# Patient Record
Sex: FEMALE | Race: BLACK OR AFRICAN AMERICAN | Employment: FULL TIME | URBAN - METROPOLITAN AREA
[De-identification: names, ages, dates, MRNs, and addresses within clinical notes are randomized per-mention and may not be internally consistent; named-entity substitution may affect disease eponyms.]

---

## 2022-03-19 PROBLEM — E88.81 METABOLIC SYNDROME X: Status: ACTIVE | Noted: 2018-03-27

## 2022-03-19 PROBLEM — E88.810 METABOLIC SYNDROME X: Status: ACTIVE | Noted: 2018-03-27

## 2022-03-19 PROBLEM — E28.2 PCOS (POLYCYSTIC OVARIAN SYNDROME): Status: ACTIVE | Noted: 2018-03-27

## 2022-11-30 ENCOUNTER — HOSPITAL ENCOUNTER (EMERGENCY)
Age: 27
Discharge: HOME | End: 2022-11-30
Attending: EMERGENCY MEDICINE

## 2022-11-30 VITALS
SYSTOLIC BLOOD PRESSURE: 152 MMHG | DIASTOLIC BLOOD PRESSURE: 105 MMHG | HEART RATE: 73 BPM | RESPIRATION RATE: 20 BRPM | OXYGEN SATURATION: 100 % | TEMPERATURE: 98.6 F

## 2022-11-30 DIAGNOSIS — R31.9 HEMATURIA, UNSPECIFIED TYPE: Primary | ICD-10-CM

## 2022-11-30 LAB
ALBUMIN SERPL-MCNC: 4.2 G/DL (ref 3.5–5)
ALBUMIN/GLOB SERPL: 1.3 {RATIO} (ref 0.4–1.6)
ALP SERPL-CCNC: 70 U/L (ref 45–117)
ALT SERPL-CCNC: 13 U/L (ref 13–61)
ANION GAP SERPL CALC-SCNC: 12 MMOL/L (ref 2–11)
APPEARANCE UR: ABNORMAL
AST SERPL-CCNC: 20 U/L (ref 15–37)
BACTERIA URNS QL MICRO: 0 /HPF
BASOPHILS # BLD: 0 K/UL (ref 0–0.2)
BASOPHILS NFR BLD: 0 % (ref 0–2)
BILIRUB SERPL-MCNC: 0.6 MG/DL (ref 0.2–1.1)
BILIRUB UR QL: NEGATIVE
BUN SERPL-MCNC: 9 MG/DL (ref 7–18)
CALCIUM SERPL-MCNC: 9.3 MG/DL (ref 8.3–10.4)
CASTS URNS QL MICRO: 0 /LPF
CHLORIDE SERPL-SCNC: 104 MMOL/L (ref 98–107)
CO2 SERPL-SCNC: 24 MMOL/L (ref 21–32)
COLOR UR: ABNORMAL
CREAT SERPL-MCNC: 0.72 MG/DL (ref 0.6–1)
CRYSTALS URNS QL MICRO: 0 /LPF
DIFFERENTIAL METHOD BLD: ABNORMAL
EOSINOPHIL # BLD: 0.2 K/UL (ref 0–0.8)
EOSINOPHIL NFR BLD: 2 % (ref 0.5–7.8)
EPI CELLS #/AREA URNS HPF: NORMAL /HPF
ERYTHROCYTE [DISTWIDTH] IN BLOOD BY AUTOMATED COUNT: 13.3 % (ref 11.9–14.6)
GLOBULIN SER CALC-MCNC: 3.3 G/DL (ref 2.8–4.5)
GLUCOSE SERPL-MCNC: 91 MG/DL (ref 65–100)
GLUCOSE UR STRIP.AUTO-MCNC: NEGATIVE MG/DL
HCG UR QL: NEGATIVE
HCT VFR BLD AUTO: 39.4 % (ref 35.8–46.3)
HGB BLD-MCNC: 13.1 G/DL (ref 11.7–15.4)
HGB UR QL STRIP: ABNORMAL
IMM GRANULOCYTES # BLD AUTO: 0 K/UL (ref 0–0.5)
IMM GRANULOCYTES NFR BLD AUTO: 0 % (ref 0–5)
KETONES UR QL STRIP.AUTO: NEGATIVE MG/DL
LEUKOCYTE ESTERASE UR QL STRIP.AUTO: ABNORMAL
LYMPHOCYTES # BLD: 3.1 K/UL (ref 0.5–4.6)
LYMPHOCYTES NFR BLD: 29 % (ref 13–44)
MCH RBC QN AUTO: 27 PG (ref 26.1–32.9)
MCHC RBC AUTO-ENTMCNC: 33.2 G/DL (ref 31.4–35)
MCV RBC AUTO: 81.2 FL (ref 82–102)
MONOCYTES # BLD: 0.8 K/UL (ref 0.1–1.3)
MONOCYTES NFR BLD: 7 % (ref 4–12)
MUCOUS THREADS URNS QL MICRO: 0 /LPF
NEUTS SEG # BLD: 6.5 K/UL (ref 1.7–8.2)
NEUTS SEG NFR BLD: 61 % (ref 43–78)
NITRITE UR QL STRIP.AUTO: NEGATIVE
NRBC # BLD: 0 K/UL (ref 0–0.2)
OTHER OBSERVATIONS: NORMAL
PH UR STRIP: 6 [PH] (ref 5–9)
PLATELET # BLD AUTO: 351 K/UL (ref 150–450)
PMV BLD AUTO: 9.6 FL (ref 9.4–12.3)
POTASSIUM SERPL-SCNC: 3.7 MMOL/L (ref 3.5–5.1)
PROT SERPL-MCNC: 7.5 G/DL (ref 6.4–8.2)
PROT UR STRIP-MCNC: NEGATIVE MG/DL
RBC # BLD AUTO: 4.85 M/UL (ref 4.05–5.2)
RBC #/AREA URNS HPF: >100 /HPF
SODIUM SERPL-SCNC: 140 MMOL/L (ref 133–143)
SP GR UR REFRACTOMETRY: 1.02 (ref 1–1.02)
UROBILINOGEN UR QL STRIP.AUTO: 0.2 EU/DL (ref 0.2–1)
WBC # BLD AUTO: 10.6 K/UL (ref 4.3–11.1)
WBC URNS QL MICRO: NORMAL /HPF

## 2022-11-30 PROCEDURE — 81001 URINALYSIS AUTO W/SCOPE: CPT

## 2022-11-30 PROCEDURE — 87491 CHLMYD TRACH DNA AMP PROBE: CPT

## 2022-11-30 PROCEDURE — 80053 COMPREHEN METABOLIC PANEL: CPT

## 2022-11-30 PROCEDURE — 85025 COMPLETE CBC W/AUTO DIFF WBC: CPT

## 2022-11-30 PROCEDURE — 81025 URINE PREGNANCY TEST: CPT

## 2022-11-30 PROCEDURE — 99283 EMERGENCY DEPT VISIT LOW MDM: CPT

## 2022-11-30 PROCEDURE — 87210 SMEAR WET MOUNT SALINE/INK: CPT

## 2022-11-30 RX ORDER — CEPHALEXIN 500 MG/1
500 CAPSULE ORAL 3 TIMES DAILY
Qty: 30 CAPSULE | Refills: 0 | Status: SHIPPED | OUTPATIENT
Start: 2022-11-30 | End: 2022-12-10

## 2022-11-30 ASSESSMENT — ENCOUNTER SYMPTOMS
DIARRHEA: 0
RHINORRHEA: 0
CONSTIPATION: 0
SORE THROAT: 0
ABDOMINAL PAIN: 1
COLOR CHANGE: 0
VOMITING: 0
COUGH: 0
BACK PAIN: 1
SHORTNESS OF BREATH: 0
NAUSEA: 1

## 2022-11-30 ASSESSMENT — PAIN - FUNCTIONAL ASSESSMENT: PAIN_FUNCTIONAL_ASSESSMENT: 0-10

## 2022-11-30 ASSESSMENT — PAIN SCALES - GENERAL: PAINLEVEL_OUTOF10: 6

## 2022-11-30 ASSESSMENT — PAIN DESCRIPTION - LOCATION: LOCATION: PELVIS

## 2022-12-01 LAB
SERVICE CMNT-IMP: NORMAL
WET PREP GENITAL: NORMAL
WET PREP GENITAL: NORMAL

## 2022-12-01 NOTE — ED PROVIDER NOTES
hematuria as gross hematuria. The hematuria occurs during the initial portion of her urinary stream. The pain is moderate. Obstructive symptoms do not include an intermittent stream or straining. Associated symptoms include abdominal pain and nausea. Pertinent negatives include no chills, dysuria, fever, flank pain, genital pain, inability to urinate, urinary retention or vomiting. All other systems reviewed and are negative unless otherwise stated in the history of present illness section. Review of Systems   Constitutional:  Negative for chills and fever. HENT:  Negative for rhinorrhea and sore throat. Respiratory:  Negative for cough and shortness of breath. Cardiovascular:  Negative for chest pain and palpitations. Gastrointestinal:  Positive for abdominal pain and nausea. Negative for constipation, diarrhea and vomiting. Genitourinary:  Positive for hematuria. Negative for dysuria, flank pain, vaginal bleeding and vaginal discharge. Musculoskeletal:  Positive for back pain. Negative for neck pain. Skin:  Negative for color change and rash. Neurological:  Negative for numbness and headaches. All other systems reviewed and are negative. No past medical history on file. No past surgical history on file. No family history on file. Social History     Socioeconomic History    Marital status: Single        Allergies: Patient has no known allergies. Previous Medications    No medications on file        Vitals signs and nursing note reviewed. No data found. Physical Exam  Vitals and nursing note reviewed. Constitutional:       Appearance: Normal appearance. HENT:      Head: Normocephalic and atraumatic. Cardiovascular:      Rate and Rhythm: Normal rate and regular rhythm. Pulmonary:      Effort: Pulmonary effort is normal.      Breath sounds: Normal breath sounds. No wheezing.    Abdominal:      General: Bowel sounds are normal.      Palpations: Abdomen is soft.      Tenderness: There is abdominal tenderness (mild suprapubic). Musculoskeletal:         General: No swelling. Normal range of motion. Cervical back: Normal range of motion. No tenderness. Skin:     General: Skin is warm and dry. Neurological:      Mental Status: She is alert.         Procedures      Results for orders placed or performed during the hospital encounter of 11/30/22   Wet prep, genital    Specimen: Vaginal   Result Value Ref Range    Special Requests NO SPECIAL REQUESTS      Wet Prep NO YEAST,TRICHOMONAS OR CLUE CELLS NOTED     Pregnancy, Urine   Result Value Ref Range    HCG(Urine) Pregnancy Test Negative     Urinalysis w rflx microscopic   Result Value Ref Range    Color, UA KYM      Appearance SLIGHTLY CLOUDY      Specific Gravity, UA 1.020 1.001 - 1.023      pH, Urine 6.0 5.0 - 9.0      Protein, UA Negative NEG mg/dL    Glucose, UA Negative mg/dL    Ketones, Urine Negative NEG mg/dL    Bilirubin Urine Negative NEG      Blood, Urine LARGE (A) NEG      Urobilinogen, Urine 0.2 0.2 - 1.0 EU/dL    Nitrite, Urine Negative NEG      Leukocyte Esterase, Urine TRACE (A) NEG     Urinalysis, Micro   Result Value Ref Range    WBC, UA 0-3 0 /hpf    RBC, UA >100 0 /hpf    Epithelial Cells UA 0-3 0 /hpf    BACTERIA, URINE 0 0 /hpf    Casts 0 0 /lpf    Crystals 0 0 /LPF    Mucus, UA 0 0 /lpf    Other observations RESULTS VERIFIED MANUALLY     CBC with Auto Differential   Result Value Ref Range    WBC 10.6 4.3 - 11.1 K/uL    RBC 4.85 4.05 - 5.20 M/uL    Hemoglobin 13.1 11.7 - 15.4 g/dL    Hematocrit 39.4 35.8 - 46.3 %    MCV 81.2 (L) 82.0 - 102.0 FL    MCH 27.0 26.1 - 32.9 PG    MCHC 33.2 31.4 - 35.0 g/dL    RDW 13.3 11.9 - 14.6 %    Platelets 752 929 - 990 K/uL    MPV 9.6 9.4 - 12.3 FL    nRBC 0.00 0.0 - 0.2 K/uL    Differential Type AUTOMATED      Seg Neutrophils 61 43 - 78 %    Lymphocytes 29 13 - 44 %    Monocytes 7 4.0 - 12.0 %    Eosinophils % 2 0.5 - 7.8 %    Basophils 0 0.0 - 2.0 % Immature Granulocytes 0 0.0 - 5.0 %    Segs Absolute 6.5 1.7 - 8.2 K/UL    Absolute Lymph # 3.1 0.5 - 4.6 K/UL    Absolute Mono # 0.8 0.1 - 1.3 K/UL    Absolute Eos # 0.2 0.0 - 0.8 K/UL    Basophils Absolute 0.0 0.0 - 0.2 K/UL    Absolute Immature Granulocyte 0.0 0.0 - 0.5 K/UL   Comprehensive Metabolic Panel   Result Value Ref Range    Sodium 140 133 - 143 mmol/L    Potassium 3.7 3.5 - 5.1 mmol/L    Chloride 104 98 - 107 mmol/L    CO2 24 21 - 32 mmol/L    Anion Gap 12 (H) 2 - 11 mmol/L    Glucose 91 65 - 100 mg/dL    BUN 9 7.0 - 18.0 MG/DL    Creatinine 0.72 0.6 - 1.0 MG/DL    Est, Glom Filt Rate >60 >60 ml/min/1.73m2    Calcium 9.3 8.3 - 10.4 MG/DL    Total Bilirubin 0.6 0.2 - 1.1 MG/DL    ALT 13 13.0 - 61.0 U/L    AST 20 15 - 37 U/L    Alk Phosphatase 70 45.0 - 117.0 U/L    Total Protein 7.5 6.4 - 8.2 g/dL    Albumin 4.2 3.5 - 5.0 g/dL    Globulin 3.3 2.8 - 4.5 g/dL    Albumin/Globulin Ratio 1.3 0.4 - 1.6          No orders to display                         Voice dictation software was used during the making of this note. This software is not perfect and grammatical and other typographical errors may be present. This note has not been completely proofread for errors.      Alicia Quevedo III, MD  11/30/22 9310

## 2022-12-01 NOTE — ED TRIAGE NOTES
Arrives with face mask in place. Ambulatory to room 2, accompanied by visitor. Reports hx PCOS, has taken meds in past for menstrual cycles however has not had recently. Reports approx 2 weeks ago, began experiencing lower abdominal/pelvic pain of which worsened today. Associated nausea. States began noticing blood to urine today. Denies pain with urination. Denies vomiting,diarrhea, fevers, vaginal discharge. Received transport form from [facility] to [facility]. Transportation arranged with Valeria at [email or web address] for 1630. Job #[id number] Notified ANTONY Montieloma.

## 2022-12-01 NOTE — ED NOTES
I have reviewed discharge instructions with the patient. The patient verbalized understanding. Patient left ED via Discharge Method: ambulatory to Home with FAMILY MEMBER. Opportunity for questions and clarification provided. Patient given 1 scripts. To continue your aftercare when you leave the hospital, you may receive an automated call from our care team to check in on how you are doing. This is a free service and part of our promise to provide the best care and service to meet your aftercare needs.  If you have questions, or wish to unsubscribe from this service please call [phone]. Thank you for Choosing our [facility] Emergency Department.       Abner Bar RN  11/30/22 3599

## 2022-12-04 LAB
C TRACH RRNA SPEC QL NAA+PROBE: NEGATIVE
N GONORRHOEA RRNA SPEC QL NAA+PROBE: NEGATIVE
SPECIMEN SOURCE: NORMAL

## 2023-03-08 ENCOUNTER — HOSPITAL ENCOUNTER (EMERGENCY)
Age: 28
Discharge: HOME | End: 2023-03-08
Attending: EMERGENCY MEDICINE

## 2023-03-08 ENCOUNTER — APPOINTMENT (OUTPATIENT)
Dept: ULTRASOUND IMAGING | Age: 28
End: 2023-03-08

## 2023-03-08 ENCOUNTER — APPOINTMENT (OUTPATIENT)
Dept: GENERAL RADIOLOGY | Age: 28
End: 2023-03-08

## 2023-03-08 VITALS
RESPIRATION RATE: 17 BRPM | WEIGHT: 293 LBS | TEMPERATURE: 98.4 F | OXYGEN SATURATION: 100 % | HEART RATE: 71 BPM | DIASTOLIC BLOOD PRESSURE: 91 MMHG | HEIGHT: 64 IN | BODY MASS INDEX: 50.02 KG/M2 | SYSTOLIC BLOOD PRESSURE: 152 MMHG

## 2023-03-08 DIAGNOSIS — M25.572 ACUTE LEFT ANKLE PAIN: Primary | ICD-10-CM

## 2023-03-08 PROCEDURE — 99284 EMERGENCY DEPT VISIT MOD MDM: CPT

## 2023-03-08 PROCEDURE — 93971 EXTREMITY STUDY: CPT

## 2023-03-08 PROCEDURE — 6360000002 HC RX W HCPCS: Performed by: EMERGENCY MEDICINE

## 2023-03-08 PROCEDURE — 73610 X-RAY EXAM OF ANKLE: CPT

## 2023-03-08 RX ORDER — DEXAMETHASONE SODIUM PHOSPHATE 10 MG/ML
10 INJECTION INTRAMUSCULAR; INTRAVENOUS ONCE
Status: COMPLETED | OUTPATIENT
Start: 2023-03-08 | End: 2023-03-08

## 2023-03-08 RX ADMIN — DEXAMETHASONE SODIUM PHOSPHATE 10 MG: 10 INJECTION INTRAMUSCULAR; INTRAVENOUS at 08:48

## 2023-03-08 ASSESSMENT — PAIN SCALES - GENERAL
PAINLEVEL_OUTOF10: 4
PAINLEVEL_OUTOF10: 6

## 2023-03-08 ASSESSMENT — LIFESTYLE VARIABLES
HOW OFTEN DO YOU HAVE A DRINK CONTAINING ALCOHOL: NEVER
HOW MANY STANDARD DRINKS CONTAINING ALCOHOL DO YOU HAVE ON A TYPICAL DAY: PATIENT DOES NOT DRINK

## 2023-03-08 ASSESSMENT — PAIN - FUNCTIONAL ASSESSMENT
PAIN_FUNCTIONAL_ASSESSMENT: 0-10
PAIN_FUNCTIONAL_ASSESSMENT: 0-10

## 2023-03-08 NOTE — ED PROVIDER NOTES
Emergency Department Provider Note                   PCP:                None None               Age: 29 y.o. Sex: female     Final diagnosis/impression:  1. Acute left ankle pain         Disposition: Discharge    MDM/Clinical Course:  Patient seen by myself at the [place] [address] emergency department. History was collected from the patient. In consideration of patient problem this represents an acute problem with potential consequences to func bodily function tion. Patient had signs symptoms and clinical history most consistent with ankle pain of unclear etiology. Factors complicating medical decision making or management more complex include unclear nature of etiology of patient's symptoms. Work-up considered but not performed includes laboratory work-up as it was not felt to be high yield for patient management. Patient does not have signs of soft tissue inflammation or erythema, skin changes or findings of cellulitis.,  Do not suspect septic arthritis and patient's symptoms/physical exam findings not particularly consistent with gout either. Overall suspect irritation of previous injury or reinjury of the ankle in an unknown fashion which can happen with outpatient awareness still a later time. Radiology shows no evidence of DVT and no evidence of acute bony abnormality including no effusion. While under my care, patient received oral Decadron dose 10 mg one-time. Recommended rest, ice, elevation, recommended over-the-counter Tylenol/ibuprofen use, recommended ankle stabilizer as needed, follow-up with a primary care provider. Patient/family given instructions to return as needed for any questions, concerns or worsening symptoms, particularly those as outlined in the disposition section / discharge section of patient discharge paperwork. Patient/family verbalizes understanding agreement with ED course/plan in shared medical decision making. Questions answered.     Did discuss with patient that medications given during visit / discharge prescriptions would not nessarily be those selected if patient were pregnant and such medications could potentially cause harm to a developing fetus or pregnancy. Patient is declining pregnancy testing during visit which is reasonable and within her choice as patient here in the emergency department. Patient does not feel she could be pregnant. Complexity of Problems Addressed: An acute problem with uncertain diagnosis/prognosis    Data Reviewed and Analyzed:  Category 1:   I reviewed records from an external source: provider visit notes from outside specialist.  1/23/2019 orthopedic office visit    Category 2:       Radiology:  My independent review of patient ankle x-ray shows no joint space abnormality, no dislocation, no fracture    Category 3: Discussion of management or test interpretation. See MDM / clinical course section above for details    Risk of Complications and/or Morbidity of Patient Management:  OTC drug management performed and Considerations: The following items were considered but not ordered: Laboratory testing, attempt to analyze joint fluid    Shared medical decision making performed with patient/family during course of ER visit and a determination of disposition whenever appropriate. Orders Placed This Encounter   Procedures    XR ANKLE LEFT (MIN 3 VIEWS)    Vascular duplex lower extremity venous left      ED Meds Given:  Medications   dexamethasone (DECADRON) Oral 10 mg (10 mg Oral Given 3/8/23 0848)     There are no discharge medications for this patient.        ___________________    HPI: Valery Pritchett is a 29 y.o. female with no pertinent past medical history presenting for evaluation of ankle pain symptoms. Patient notes roughly 2 to 3 years ago she suffered a bad ankle sprain/injury to the left ankle, was diagnosed with a sprain at that time and used a walking boot during process of recovery.   Patient has not had issues with the ankle since that time per her report. Patient notes on Saturday she began having increasing pain and swelling at the ankle, describes pain in the anterior lateral ankle as well as the posterior medial ankle. Patient denies any known fall or injury. Patient denies any fevers or chills, denies any previous history of blood clots, denies smoking history. Patient denies history of IV drug use. No skin changes. Patient grandfather does have a history of gout but patient and patient father do not. Patient is able to mobilize the joint somewhat though it is painful. Patient reports some soft tissue swelling along the area as previously mentioned. Patient/family denies any other evaluation for today's acute complaint. Patient/family denies any other aggravating or alleviating factors. Patient/family denies any other symptoms. ROS:   All review of systems negative except as noted above in the history of the present illness. Past Medical/ Family/ Social History:     Medical history: History reviewed. No pertinent past medical history. Patient denies pertinent medical history  Surgical history: History reviewed. No pertinent surgical history. Family history: History reviewed. No pertinent family history. HTN, CAD, Grandfather gout    Social history:   Social History     Socioeconomic History    Marital status: Single     Spouse name: None    Number of children: None    Years of education: None    Highest education level: None   Tobacco Use    Smoking status: Never    Smokeless tobacco: Never        Medications:   Previous Medications    No medications on file        Allergies: No Known Allergies    Physical Exam   Vitals signs reviewed.    Patient Vitals for the past 4 hrs:   Temp Pulse Resp BP SpO2   03/08/23 0752 -- -- -- (!) 164/96 --   03/08/23 0751 98.4 °F (36.9 °C) 76 18 -- 98 %       General: Alert and oriented ×4, no acute distress   Eyes: Anicteric, conjunctiva pink, PERRLA, EOMI  ENT: No nasal discharge, no gross nasal congestion present  Pulmonary: Clear to auscultation bilaterally with symmetric chest rise, no increased work of breathing, no accessory muscle use  Cardiovascular: Regular rate and rhythm, no rub or gallop appreciated on my exam  GI: Abdomen is soft, nontender, nondistended  Musculoskeletal: There is soft tissue swelling of the ankle particularly anterior lateral and posterior medial areas. No obvious joint deformity or joint effusion. DPA pulses 2+ bilaterally, capillary fill less than 2 seconds bilaterally, sensation and motor function intact, patient is able to flex/extend and mobilize the ankle though it is mildly uncomfortable for the patient. No overlying skin changes. No significant/noted calor of the joint  Neuro: Cranial nerves II through VII grossly intact, strength and sensation is grossly intact in the upper and lower extremities bilaterally  Skin: Skin is warm and dry    Procedures    Results for orders placed or performed during the hospital encounter of 03/08/23   XR ANKLE LEFT (MIN 3 VIEWS)    Narrative    Left ankle    CLINICAL INDICATION: Left ankle pain with soft tissue swelling    FINDINGS: Three views of the left ankle submitted. There is no fracture. There  is a small amount of generalized soft tissue swelling. Ankle mortise is intact. Impression    No acute osseous abnormality or joint derangement of the left ankle. Vascular duplex lower extremity venous left    Narrative    Exam: Left lower extremity venous ultrasound  Indication: LEG SWELLING, PAIN, DVT SUSPECTED  Comparison: None. Grayscale, color Doppler, and spectral Doppler ultrasound of the left lower  extremity was performed. FINDINGS:   There is normal flow in the common femoral, superficial femoral, profunda  femoral, and popliteal veins. Normal flow in the greater saphenous vein. Normal  compression and augmentation is demonstrated. The proximal calf veins are also  patent. Impression    No evidence of deep venous thrombosis in the left lower extremity. XR ANKLE LEFT (MIN 3 VIEWS)   Final Result   No acute osseous abnormality or joint derangement of the left ankle. Vascular duplex lower extremity venous left   Final Result   No evidence of deep venous thrombosis in the left lower extremity. Voice dictation software was used during the making of this note. This software is not perfect and grammatical and other typographical errors may be present. This note has not been completely proofread for errors.      Delon Encarnacion MD  03/08/23 8639

## 2023-03-08 NOTE — ED TRIAGE NOTES
Pt arrives for complaints of left ankle swelling and pain for a few days now. Pt reports she injured this ankle \"a while ago\" and has been having problems since. No new injury. Sensation intact distal to injury/pain. No deformity noted.

## 2023-03-08 NOTE — DISCHARGE INSTRUCTIONS
Recommend elevating, icing your ankle, may also use ankle wrap/ankle support braces as needed. Recommend close monitoring of symptoms, watch out for signs of development of fever, skin changes, increasing/unremitting pain return as needed for any of the symptoms or for any questions or concerns. You may take ibuprofen/Tylenol as needed for pain/inflammation symptoms.

## 2023-03-08 NOTE — Clinical Note
Deny Navas was seen and treated in our emergency department on 3/8/2023. She may return to work on 03/11/2023. Please allow patient to elevate, ice, rest left ankle as needed to help prevent worsening of ankle pain/injuries     If you have any questions or concerns, please don't hesitate to call.       Madison Gilman MD

## 2023-03-08 NOTE — ED NOTES
I have reviewed discharge instructions with the patient. The patient verbalized understanding. Patient left ED via Discharge Method: ambulatory to Home with self    Opportunity for questions and clarification provided. Patient given 0 scripts. To continue your aftercare when you leave the hospital, you may receive an automated call from our care team to check in on how you are doing. This is a free service and part of our promise to provide the best care and service to meet your aftercare needs.  If you have questions, or wish to unsubscribe from this service please call [phone]. Thank you for Choosing our [address] [place] [address] Emergency Department.       Thania Dunn RN  03/08/23 9270

## 2023-07-03 ENCOUNTER — OFFICE VISIT (OUTPATIENT)
Dept: OBGYN CLINIC | Age: 28
End: 2023-07-03

## 2023-07-03 VITALS
HEIGHT: 64 IN | DIASTOLIC BLOOD PRESSURE: 82 MMHG | WEIGHT: 293 LBS | BODY MASS INDEX: 50.02 KG/M2 | SYSTOLIC BLOOD PRESSURE: 124 MMHG

## 2023-07-03 DIAGNOSIS — Z13.89 SCREENING FOR GENITOURINARY CONDITION: ICD-10-CM

## 2023-07-03 DIAGNOSIS — E28.2 PCOS (POLYCYSTIC OVARIAN SYNDROME): Primary | ICD-10-CM

## 2023-07-03 DIAGNOSIS — Z12.4 SCREENING FOR CERVICAL CANCER: ICD-10-CM

## 2023-07-03 DIAGNOSIS — Z01.419 WELL WOMAN EXAM: ICD-10-CM

## 2023-07-03 LAB
BILIRUBIN, URINE, POC: NEGATIVE
BLOOD URINE, POC: NEGATIVE
GLUCOSE URINE, POC: NEGATIVE
KETONES, URINE, POC: NEGATIVE
LEUKOCYTE ESTERASE, URINE, POC: NORMAL
NITRITE, URINE, POC: NEGATIVE
PH, URINE, POC: 7 (ref 4.6–8)
PROTEIN,URINE, POC: NEGATIVE
SPECIFIC GRAVITY, URINE, POC: 1.02 (ref 1–1.03)
URINALYSIS CLARITY, POC: CLEAR
URINALYSIS COLOR, POC: YELLOW
UROBILINOGEN, POC: NORMAL

## 2023-07-03 PROCEDURE — 99385 PREV VISIT NEW AGE 18-39: CPT | Performed by: NURSE PRACTITIONER

## 2023-07-03 PROCEDURE — 81003 URINALYSIS AUTO W/O SCOPE: CPT | Performed by: NURSE PRACTITIONER

## 2023-07-07 LAB
CYTOLOGIST CVX/VAG CYTO: NORMAL
CYTOLOGY CVX/VAG DOC THIN PREP: NORMAL
HPV REFLEX: NORMAL
Lab: NORMAL
PATH REPORT.FINAL DX SPEC: NORMAL
STAT OF ADQ CVX/VAG CYTO-IMP: NORMAL

## 2023-07-10 ENCOUNTER — TELEPHONE (OUTPATIENT)
Dept: OBGYN CLINIC | Age: 28
End: 2023-07-10

## 2023-07-10 DIAGNOSIS — A59.9 TRICHOMONAS INFECTION: Primary | ICD-10-CM

## 2023-07-10 RX ORDER — METRONIDAZOLE 500 MG/1
2000 TABLET ORAL ONCE
Qty: 4 TABLET | Refills: 0 | Status: SHIPPED | OUTPATIENT
Start: 2023-07-10 | End: 2023-07-10

## 2023-07-10 NOTE — TELEPHONE ENCOUNTER
----- Message from SHMUEL Conway - CNP sent at 7/10/2023  7:46 AM EDT -----  Normal pap  Pos for trich  Flagyl 2g PO x1  Partner needs tx  ISAAC 6 weeks

## 2023-07-11 ENCOUNTER — TELEPHONE (OUTPATIENT)
Dept: OBGYN CLINIC | Age: 28
End: 2023-07-11

## 2023-08-29 ENCOUNTER — OFFICE VISIT (OUTPATIENT)
Dept: OBGYN CLINIC | Age: 28
End: 2023-08-29

## 2023-08-29 VITALS
HEIGHT: 64 IN | WEIGHT: 293 LBS | SYSTOLIC BLOOD PRESSURE: 134 MMHG | DIASTOLIC BLOOD PRESSURE: 88 MMHG | BODY MASS INDEX: 50.02 KG/M2

## 2023-08-29 DIAGNOSIS — Z11.3 SCREENING EXAMINATION FOR VENEREAL DISEASE: ICD-10-CM

## 2023-08-29 DIAGNOSIS — Z11.8 SCREENING FOR VIRAL AND CHLAMYDIAL DISEASES: ICD-10-CM

## 2023-08-29 DIAGNOSIS — N91.2 AMENORRHEA: ICD-10-CM

## 2023-08-29 DIAGNOSIS — E28.2 PCOS (POLYCYSTIC OVARIAN SYNDROME): ICD-10-CM

## 2023-08-29 DIAGNOSIS — A59.9 TRICHOMONAS INFECTION: Primary | ICD-10-CM

## 2023-08-29 DIAGNOSIS — Z11.59 SCREENING FOR VIRAL AND CHLAMYDIAL DISEASES: ICD-10-CM

## 2023-08-29 PROCEDURE — 99214 OFFICE O/P EST MOD 30 MIN: CPT | Performed by: NURSE PRACTITIONER

## 2023-08-29 RX ORDER — MEDROXYPROGESTERONE ACETATE 10 MG/1
TABLET ORAL
Qty: 7 TABLET | Refills: 1 | Status: SHIPPED | OUTPATIENT
Start: 2023-08-29

## 2023-09-01 LAB
C TRACH RRNA SPEC QL NAA+PROBE: NEGATIVE
N GONORRHOEA RRNA SPEC QL NAA+PROBE: NEGATIVE
SPECIMEN SOURCE: NORMAL
T VAGINALIS RRNA SPEC QL NAA+PROBE: NEGATIVE

## 2023-10-03 NOTE — PROGRESS NOTES
The patient is a 29 y.o. Cristian Peaks who is seen for med recheck. Pt last seen 23 and started on cyclic provera for PCOS/Amenorrhea. States started the metformin, which caused severe diarrhea, so did not continue. Pt reports cycle lasted from -/ after provera. Thinks she's ready to discuss RISHI. HISTORY:      Patient's last menstrual period was 2023 (exact date). No current outpatient medications on file prior to visit. No current facility-administered medications on file prior to visit. ROS:  Feeling well. No dyspnea or chest pain on exertion. No abdominal pain, change in bowel habits, black or bloody stools. No urinary tract symptoms. GYN ROS: see above. PHYSICAL EXAM:  Blood pressure 130/84, height 5' 4\" (1.626 m), weight (!) 335 lb 14.4 oz (152.4 kg), last menstrual period 2023. The patient appears well, alert, oriented x 3, in no distress. ASSESSMENT:  Encounter Diagnoses   Name Primary? PCOS (polycystic ovarian syndrome) Yes    Amenorrhea     Weight loss advised        PLAN:  All questions answered  Discussed RISHI options for management of symptoms- pt would like OCP  Sprintec to pharmacy  Wt loss encouraged as this may also help manage symptoms  Discussed low carb/low sugar diet  Increase exercise  Pt agreeable and happy with plan  To call with any further questions/concerns       Supervising physician is Dr. Geo Horta. Greater than 50% of the 20 minute visit were spent in counseling to the above topics.

## 2023-10-04 ENCOUNTER — OFFICE VISIT (OUTPATIENT)
Dept: OBGYN CLINIC | Age: 28
End: 2023-10-04

## 2023-10-04 VITALS
HEIGHT: 64 IN | SYSTOLIC BLOOD PRESSURE: 130 MMHG | BODY MASS INDEX: 50.02 KG/M2 | DIASTOLIC BLOOD PRESSURE: 84 MMHG | WEIGHT: 293 LBS

## 2023-10-04 DIAGNOSIS — N91.2 AMENORRHEA: ICD-10-CM

## 2023-10-04 DIAGNOSIS — E28.2 PCOS (POLYCYSTIC OVARIAN SYNDROME): Primary | ICD-10-CM

## 2023-10-04 DIAGNOSIS — Z78.9 WEIGHT LOSS ADVISED: ICD-10-CM

## 2023-10-04 PROCEDURE — 99213 OFFICE O/P EST LOW 20 MIN: CPT | Performed by: NURSE PRACTITIONER

## 2023-10-04 RX ORDER — NORGESTIMATE AND ETHINYL ESTRADIOL 0.25-0.035
1 KIT ORAL DAILY
Qty: 3 PACKET | Refills: 3 | Status: SHIPPED | OUTPATIENT
Start: 2023-10-04

## 2024-01-31 NOTE — PROGRESS NOTES
The patient is a 29 y.o.  who is seen for pain in lower back. Denies burning/frequency with urination. Denies flank pain hematuria. Denies vaginal discharge/odor/itching. Denies new soaps/detergents. Denies fevers. Denies new partners. Currently LMP 23. H/o PCOS for which she was previously taking sprintec, but had come off for a while and recently resumed. Is aware late menses likely due to poorly controlled PCOS.         HISTORY:      Patient's last menstrual period was 2023 (exact date).  Sexual History:  single partner, contraception - none  Contraception:  OCP (estrogen/progesterone)  Current Outpatient Medications on File Prior to Visit   Medication Sig Dispense Refill    norgestimate-ethinyl estradiol (SPRINTEC 28) 0.25-35 MG-MCG per tablet Take 1 tablet by mouth daily 3 packet 3     No current facility-administered medications on file prior to visit.       ROS:  Feeling well. No dyspnea or chest pain on exertion.  No abdominal pain, change in bowel habits, black or bloody stools.  No urinary tract symptoms. GYN ROS: see above.    PHYSICAL EXAM:  Blood pressure 125/78, height 1.626 m (5' 4\"), weight (!) 156.5 kg (345 lb 1.6 oz), last menstrual period 2023.    The patient appears well, alert, oriented x 3, in no distress.    ASSESSMENT:  Encounter Diagnoses   Name Primary?    Amenorrhea Yes    Screening for genitourinary condition     Low back pain without sciatica, unspecified back pain laterality, unspecified chronicity        PLAN:  All questions answered  Wt loss encouraged  Low carb/low sugar diet to aid in PCOS management  UA WNL  Send for cx  Discussed importance of adherence to rx for proper PCOS management  Pt verbalized understanding   RTC for TVUS to further eval stripe/ovaries as pt has not been properly taking sprintec for PCOS management  Will call pt with results and manage accordingly   Pt happy with plan         Orders Placed This Encounter   Procedures

## 2024-02-01 ENCOUNTER — OFFICE VISIT (OUTPATIENT)
Dept: OBGYN CLINIC | Age: 29
End: 2024-02-01

## 2024-02-01 VITALS
WEIGHT: 293 LBS | BODY MASS INDEX: 50.02 KG/M2 | HEIGHT: 64 IN | DIASTOLIC BLOOD PRESSURE: 78 MMHG | SYSTOLIC BLOOD PRESSURE: 125 MMHG

## 2024-02-01 DIAGNOSIS — M54.50 LOW BACK PAIN WITHOUT SCIATICA, UNSPECIFIED BACK PAIN LATERALITY, UNSPECIFIED CHRONICITY: ICD-10-CM

## 2024-02-01 DIAGNOSIS — Z13.89 SCREENING FOR GENITOURINARY CONDITION: ICD-10-CM

## 2024-02-01 DIAGNOSIS — N91.2 AMENORRHEA: Primary | ICD-10-CM

## 2024-02-01 LAB
BILIRUBIN, URINE, POC: NEGATIVE
BLOOD URINE, POC: NEGATIVE
GLUCOSE URINE, POC: NEGATIVE
HCG SERPL-ACNC: <1 MIU/ML (ref 0–6)
KETONES, URINE, POC: NEGATIVE
LEUKOCYTE ESTERASE, URINE, POC: NEGATIVE
NITRITE, URINE, POC: NEGATIVE
PH, URINE, POC: 6 (ref 4.6–8)
PROTEIN,URINE, POC: NEGATIVE
SPECIFIC GRAVITY, URINE, POC: 1.02 (ref 1–1.03)
URINALYSIS CLARITY, POC: CLEAR
URINALYSIS COLOR, POC: YELLOW
UROBILINOGEN, POC: NORMAL

## 2024-02-01 PROCEDURE — 99214 OFFICE O/P EST MOD 30 MIN: CPT | Performed by: NURSE PRACTITIONER

## 2024-02-01 PROCEDURE — 81002 URINALYSIS NONAUTO W/O SCOPE: CPT | Performed by: NURSE PRACTITIONER

## 2024-02-04 LAB
BACTERIA SPEC CULT: NORMAL
SERVICE CMNT-IMP: NORMAL

## 2024-02-07 ENCOUNTER — OFFICE VISIT (OUTPATIENT)
Dept: OBGYN CLINIC | Age: 29
End: 2024-02-07

## 2024-02-07 ENCOUNTER — PROCEDURE VISIT (OUTPATIENT)
Dept: OBGYN CLINIC | Age: 29
End: 2024-02-07

## 2024-02-07 VITALS — DIASTOLIC BLOOD PRESSURE: 82 MMHG | BODY MASS INDEX: 59.65 KG/M2 | SYSTOLIC BLOOD PRESSURE: 134 MMHG | WEIGHT: 293 LBS

## 2024-02-07 DIAGNOSIS — E28.2 PCOS (POLYCYSTIC OVARIAN SYNDROME): ICD-10-CM

## 2024-02-07 DIAGNOSIS — N91.2 AMENORRHEA: Primary | ICD-10-CM

## 2024-02-07 DIAGNOSIS — M54.50 LOW BACK PAIN WITHOUT SCIATICA, UNSPECIFIED BACK PAIN LATERALITY, UNSPECIFIED CHRONICITY: Primary | ICD-10-CM

## 2024-02-07 DIAGNOSIS — N91.2 AMENORRHEA: ICD-10-CM

## 2024-02-07 PROCEDURE — 99213 OFFICE O/P EST LOW 20 MIN: CPT | Performed by: NURSE PRACTITIONER

## 2024-02-07 PROCEDURE — 76830 TRANSVAGINAL US NON-OB: CPT | Performed by: OBSTETRICS & GYNECOLOGY

## 2024-02-07 NOTE — PROGRESS NOTES
answered  US reviewed with pt and overall normal with exception of already known PCOS  Continue sprintec- discussed importance of adherence to rx for adequate cycle control  As she's only been back on OCP x1mo, discussed give 3-6mo to reach max efficacy  If no improvements- consider metformin rx  Pt amenable  Low carb/low sugar diet  Wt loss encouraged  Reassured stripe nice and thin   Heat/motrin ok as needed    Supervising physician is Dr. Fleming.  Greater than 50% of the 20 minute visit were spent in counseling to the above topics.